# Patient Record
Sex: FEMALE | Race: AMERICAN INDIAN OR ALASKA NATIVE | NOT HISPANIC OR LATINO | URBAN - METROPOLITAN AREA
[De-identification: names, ages, dates, MRNs, and addresses within clinical notes are randomized per-mention and may not be internally consistent; named-entity substitution may affect disease eponyms.]

---

## 2020-07-20 ENCOUNTER — OFFICE VISIT (OUTPATIENT)
Dept: URBAN - METROPOLITAN AREA CLINIC 40 | Facility: CLINIC | Age: 50
End: 2020-07-20

## 2020-07-20 RX ORDER — BUSPIRONE HYDROCHLORIDE 10 MG/1
TAKE 1 TABLET (10 MG) BY ORAL ROUTE 2 TIMES PER DAY TABLET ORAL 2
Qty: 0 | Refills: 0 | Status: ACTIVE | COMMUNITY
Start: 1900-01-01

## 2020-07-20 RX ORDER — LOSARTAN POTASSIUM 50 MG/1
TABLET ORAL
Qty: 0 | Refills: 0 | Status: ACTIVE | COMMUNITY
Start: 1900-01-01

## 2020-07-20 RX ORDER — PANTOPRAZOLE SODIUM 40 MG
TAKE 1 TABLET (40 MG) BY ORAL ROUTE ONCE DAILY FOR 90 DAYS TABLET, DELAYED RELEASE (ENTERIC COATED) ORAL 1
Qty: 90 | Refills: 3 | Status: ACTIVE | COMMUNITY
Start: 2020-01-20 | End: 2021-01-14

## 2020-07-20 RX ORDER — HYDROXYCHLOROQUINE SULFATE 200 MG/1
TABLET ORAL
Qty: 0 | Refills: 0 | Status: ACTIVE | COMMUNITY
Start: 1900-01-01

## 2020-07-20 RX ORDER — METOPROLOL SUCCINATE 100 MG/1
TABLET, EXTENDED RELEASE ORAL
Qty: 0 | Refills: 0 | Status: ACTIVE | COMMUNITY
Start: 1900-01-01

## 2020-07-20 RX ORDER — GABAPENTIN 300 MG/1
TAKE 1 CAPSULE (300 MG) BY ORAL ROUTE 3 TIMES PER DAY CAPSULE ORAL
Qty: 0 | Refills: 0 | Status: ACTIVE | COMMUNITY
Start: 1900-01-01

## 2020-07-20 RX ORDER — PREDNISONE 10 MG/1
TABLET ORAL
Qty: 0 | Refills: 0 | Status: ACTIVE | COMMUNITY
Start: 1900-01-01

## 2021-07-29 ENCOUNTER — DASHBOARD ENCOUNTERS (OUTPATIENT)
Age: 51
End: 2021-07-29

## 2021-07-30 ENCOUNTER — OFFICE VISIT (OUTPATIENT)
Dept: URBAN - METROPOLITAN AREA CLINIC 40 | Facility: CLINIC | Age: 51
End: 2021-07-30
Payer: COMMERCIAL

## 2021-07-30 DIAGNOSIS — Z86.2 HX OF LUPUS ANTICOAGULANT DISORDER: ICD-10-CM

## 2021-07-30 DIAGNOSIS — Z86.010 HX OF COLONIC POLYPS: ICD-10-CM

## 2021-07-30 DIAGNOSIS — Z87.19 HX OF GASTRITIS: ICD-10-CM

## 2021-07-30 PROBLEM — 428283002: Status: ACTIVE | Noted: 2021-07-30

## 2021-07-30 PROCEDURE — 99212 OFFICE O/P EST SF 10 MIN: CPT | Performed by: INTERNAL MEDICINE

## 2021-07-30 RX ORDER — BUSPIRONE HYDROCHLORIDE 10 MG/1
TAKE 1 TABLET (10 MG) BY ORAL ROUTE 2 TIMES PER DAY TABLET ORAL 2
Qty: 0 | Refills: 0 | Status: ACTIVE | COMMUNITY
Start: 1900-01-01

## 2021-07-30 RX ORDER — HYDROXYCHLOROQUINE SULFATE 200 MG/1
TABLET ORAL
Qty: 0 | Refills: 0 | Status: ACTIVE | COMMUNITY
Start: 1900-01-01

## 2021-07-30 RX ORDER — METOPROLOL SUCCINATE 100 MG/1
TABLET, EXTENDED RELEASE ORAL
Qty: 0 | Refills: 0 | Status: DISCONTINUED | COMMUNITY
Start: 1900-01-01

## 2021-07-30 RX ORDER — PREDNISONE 10 MG/1
TABLET ORAL
Qty: 0 | Refills: 0 | Status: ACTIVE | COMMUNITY
Start: 1900-01-01

## 2021-07-30 RX ORDER — GABAPENTIN 300 MG/1
TAKE 1 CAPSULE (300 MG) BY ORAL ROUTE 3 TIMES PER DAY CAPSULE ORAL
Qty: 0 | Refills: 0 | Status: ACTIVE | COMMUNITY
Start: 1900-01-01

## 2021-07-30 RX ORDER — LOSARTAN POTASSIUM 50 MG/1
TABLET ORAL
Qty: 0 | Refills: 0 | Status: ACTIVE | COMMUNITY
Start: 1900-01-01

## 2021-07-30 NOTE — HPI-TODAY'S VISIT:
Patient has a of anemia.  Colonoscopy January 2020 by Dr. Buenrostro in the hospital for rectal bleeding and anemia.  3 small adenomatous polyps removed.  There was also evidence of ileitis and hemorrhoids.  Pathology from the ileum showed no significant abnormality, the polyps were all hyperplastic polyps, no evidence of adenomatous tissue.  She had an EGD January 2020 at the same time and this showed esophagitis, hiatal hernia, gastritis and duodenitis.  Biopsies were negative for H. pylori.  Patient was not seen in the office after that, lost to follow-up due to Covid lockdown.  History of EGD by myself 2016 showed 5 cm hiatal hernia.    No recent labs to review on epic [employer] computer system.  CT abdomen 2020 showed no acute abnormality, otherwise small uterine fibroid.  Splenic artery aneurysm